# Patient Record
Sex: FEMALE | Race: WHITE | Employment: UNEMPLOYED | URBAN - METROPOLITAN AREA
[De-identification: names, ages, dates, MRNs, and addresses within clinical notes are randomized per-mention and may not be internally consistent; named-entity substitution may affect disease eponyms.]

---

## 2018-09-21 ENCOUNTER — OP HISTORICAL/CONVERTED ENCOUNTER (OUTPATIENT)
Dept: OTHER | Age: 38
End: 2018-09-21

## 2019-09-23 ENCOUNTER — OP HISTORICAL/CONVERTED ENCOUNTER (OUTPATIENT)
Dept: OTHER | Age: 39
End: 2019-09-23

## 2019-10-11 LAB — PAP SMEAR, EXTERNAL: NEGATIVE

## 2020-01-23 ENCOUNTER — ED HISTORICAL/CONVERTED ENCOUNTER (OUTPATIENT)
Dept: OTHER | Age: 40
End: 2020-01-23

## 2020-01-31 ENCOUNTER — OP HISTORICAL/CONVERTED ENCOUNTER (OUTPATIENT)
Dept: OTHER | Age: 40
End: 2020-01-31

## 2020-02-01 ENCOUNTER — OP HISTORICAL/CONVERTED ENCOUNTER (OUTPATIENT)
Dept: OTHER | Age: 40
End: 2020-02-01

## 2020-09-18 PROBLEM — I10 ESSENTIAL HYPERTENSION: Status: ACTIVE | Noted: 2020-09-18

## 2020-09-29 ENCOUNTER — OFFICE VISIT (OUTPATIENT)
Dept: OBGYN CLINIC | Age: 40
End: 2020-09-29
Payer: COMMERCIAL

## 2020-09-29 VITALS
BODY MASS INDEX: 38.59 KG/M2 | DIASTOLIC BLOOD PRESSURE: 99 MMHG | SYSTOLIC BLOOD PRESSURE: 131 MMHG | HEIGHT: 61 IN | WEIGHT: 204.38 LBS

## 2020-09-29 DIAGNOSIS — Z01.419 ROUTINE GYNECOLOGICAL EXAMINATION: Primary | ICD-10-CM

## 2020-09-29 DIAGNOSIS — Z12.4 CERVICAL CANCER SCREENING: ICD-10-CM

## 2020-09-29 DIAGNOSIS — Z12.39 BREAST CANCER SCREENING: ICD-10-CM

## 2020-09-29 DIAGNOSIS — E66.01 SEVERE OBESITY (HCC): ICD-10-CM

## 2020-09-29 PROBLEM — Z85.89 HISTORY OF NEUROENDOCRINE CANCER: Status: ACTIVE | Noted: 2020-09-29

## 2020-09-29 PROCEDURE — 99396 PREV VISIT EST AGE 40-64: CPT | Performed by: OBSTETRICS & GYNECOLOGY

## 2020-09-29 RX ORDER — GABAPENTIN 100 MG/1
CAPSULE ORAL 3 TIMES DAILY
COMMUNITY

## 2020-09-29 RX ORDER — ONDANSETRON 4 MG/1
TABLET, FILM COATED ORAL 3 TIMES DAILY
COMMUNITY
Start: 2020-01-24

## 2020-09-29 RX ORDER — VALSARTAN AND HYDROCHLOROTHIAZIDE 160; 12.5 MG/1; MG/1
1 TABLET, FILM COATED ORAL DAILY
COMMUNITY

## 2020-09-29 RX ORDER — OXYCODONE HYDROCHLORIDE 5 MG/1
TABLET ORAL 3 TIMES DAILY
COMMUNITY
Start: 2020-01-24

## 2020-09-29 RX ORDER — PANTOPRAZOLE SODIUM 40 MG/1
TABLET, DELAYED RELEASE ORAL DAILY
COMMUNITY
Start: 2020-01-16

## 2020-09-29 NOTE — PROGRESS NOTES
Aurora Turner is a 41 y.o. female who presents today for the following:  Chief Complaint   Patient presents with    Annual Exam        Allergies   Allergen Reactions    Lisinopril Cough       Current Outpatient Medications   Medication Sig    ondansetron hcl (ZOFRAN) 4 mg tablet three (3) times daily.  oxyCODONE IR (ROXICODONE) 5 mg immediate release tablet three (3) times daily.  pantoprazole (PROTONIX) 40 mg tablet daily.  valsartan-hydroCHLOROthiazide (DIOVAN-HCT) 160-12.5 mg per tablet Take 1 Tab by mouth daily.  gabapentin (NEURONTIN) 100 mg capsule Take  by mouth three (3) times daily.  vitamin D3-tocophersolan 10 mcg-4 mg/ 0.2 mL drop Take 5,000 Units by mouth daily. No current facility-administered medications for this visit. Past Medical History:   Diagnosis Date    Cancer ([facility] [address]     Hypertension        History reviewed. No pertinent surgical history.     Family History   Problem Relation Age of Onset    Hypertension Mother     Depression Mother     Hypertension Father     Depression Sister        Social History     Socioeconomic History    Marital status:      Spouse name: Not on file    Number of children: Not on file    Years of education: Not on file    Highest education level: Not on file   Occupational History    Not on file   Social Needs    Financial resource strain: Not on file    Food insecurity     Worry: Not on file     Inability: Not on file   [employer] needs     Medical: Not on file     Non-medical: Not on file   Tobacco Use    Smoking status: Never Smoker    Smokeless tobacco: Never Used   Substance and Sexual Activity    Alcohol use: Never     Frequency: Never    Drug use: Never    Sexual activity: Yes   Lifestyle    Physical activity     Days per week: Not on file     Minutes per session: Not on file    Stress: Not on file   Relationships    Social connections     Talks on phone: Not on file     Gets together: Not on file Attends Christianity service: Not on file     Active member of club or organization: Not on file     Attends meetings of clubs or organizations: Not on file     Relationship status: Not on file    Intimate partner violence     Fear of current or ex partner: Not on file     Emotionally abused: Not on file     Physically abused: Not on file     Forced sexual activity: Not on file   Other Topics Concern    Not on file   Social History Narrative    Not on file         HPI  Here for annual exam.    ROS   Review of Systems   Constitutional: Negative. HENT: Negative. Eyes: Negative. Respiratory: Negative. Cardiovascular: Negative. Gastrointestinal: Negative. Genitourinary: Negative. Musculoskeletal: Negative. Skin: Negative. Neurological: Negative. Endo/Heme/Allergies: Negative. Psychiatric/Behavioral: Negative. BP (!) 131/99 (BP 1 Location: Left arm, BP Patient Position: Sitting)   Ht 5' 1\" (1.549 m)   Wt 204 lb 6 oz (92.7 kg)   BMI 38.62 kg/m²    OBGyn Exam   Constitutional  · Appearance: well-nourished, well developed, alert, in no acute distress    HENT  · Head and Face: appears normal    Neck  · Inspection/Palpation: normal appearance, no masses or tenderness  · Lymph Nodes: no lymphadenopathy present  · Thyroid: gland size normal, nontender, no nodules or masses present on palpation    Breasts   Symmetric, no palpable masses, no tenderness, no skin changes, no nipple abnormality, no nipple discharge, no lymphadenopathy, med port palpated.     Chest  · Respiratory Effort: breathing labored  · Auscultation: normal breath sounds    Cardiovascular  · Heart:  · Auscultation: regular rate and rhythm without murmur    Gastrointestinal  · Abdominal Examination: abdomen non-tender to palpation, normal bowel sounds, no masses present  · Liver and spleen: no hepatomegaly present, spleen not palpable  · Hernias: no hernias identified    Genitourinary  · External Genitalia: normal appearance for age, no discharge present, no tenderness present, no inflammatory lesions present, no masses present, no atrophy present  · Vagina: normal vaginal vault without central or paravaginal defects, no discharge present, no inflammatory lesions present, no masses present  · Bladder: non-tender to palpation  · Urethra: appears normal  · Cervix: normal, IUD strings noted in place. · Uterus: normal size, shape and consistency  · Adnexa: no adnexal tenderness present, no adnexal masses present  · Perineum: perineum within normal limits, no evidence of trauma, no rashes or skin lesions present  · Anus: anus within normal limits, no hemorrhoids present  · Inguinal Lymph Nodes: no lymphadenopathy present    Skin  · General Inspection: no rash, no lesions identified    Neurologic/Psychiatric  · Mental Status:  · Orientation: grossly oriented to person, place and time  · Mood and Affect: mood normal, affect appropriate    No results found for this visit on 09/29/20. Orders Placed This Encounter    JEREM[facility]AS 3D [facility] W MAMMO BI SCREENING INCL CAD     Standing Status:   Future     Standing Expiration Date:   9/29/2021     Order Specific Question:   Is Patient Pregnant? Answer:   No     Order Specific Question:   Reason for Exam     Answer:   Screening    vitamin D3-tocophersolan 10 mcg-4 mg/ 0.2 mL drop     Sig: Take 5,000 Units by mouth daily.  ondansetron hcl (ZOFRAN) 4 mg tablet     Sig: three (3) times daily.  oxyCODONE IR (ROXICODONE) 5 mg immediate release tablet     Sig: three (3) times daily.  pantoprazole (PROTONIX) 40 mg tablet     Sig: daily.  valsartan-hydroCHLOROthiazide (DIOVAN-HCT) 160-12.5 mg per tablet     Sig: Take 1 Tab by mouth daily.  gabapentin (NEURONTIN) 100 mg capsule     Sig: Take  by mouth three (3) times daily.  PAP IG, CT-NG TV [address] HPV BVAO([id number], [id number])     Order Specific Question:   Pap Source?      Answer:   Cervical     Order Specific Question:   Total Hysterectomy? Answer:   No     Order Specific Question:   Supracervical Hysterectomy? Answer:   No     Order Specific Question:   Post Menopausal?     Answer:   No     Order Specific Question:   Hormone Therapy? Answer:   No     Order Specific Question:   IUD? Answer:   Yes     Order Specific Question:   Abnormal Bleeding? Answer:   No     Order Specific Question:   Pregnant     Answer:   No     Order Specific Question:   Post Partum? Answer:   No     Order Specific Question:   Other patient information related to this order? Answer:   iud         1. Routine gynecological examination  Reviewed Pap smear/HPV, mammogram, bone density colonoscopy testing guidelines. Encouraged healthy lifestyle. Discussed importanceof getting annual exams. Discussed the risk of osteoporosis and recommended 1200 to 1500 mg of calcium as well as vitamin D supplementation daily. Recommended monthly self breast exams and instructed and demonstrated to the patient on the proper technique educated on the importance of healthy weight management and the significance of not smoking. 2. Severe obesity ([address]      3. Breast cancer screening    - JEREM[facility] 3D [place] W MAMMO BI SCREENING INCL CAD; Future    4. Cervical cancer screening    - PAP IG, CT-NG TV RFX HPV IADI([id number], [id number])    History of neuroendocrine cancer on chemotherapy.     Follow-up and Dispositions    · Return in about 1 year (around 9/29/2021) for Annual Exam.

## 2020-10-02 LAB
C TRACH RRNA CVX QL NAA+PROBE: NEGATIVE
CYTOLOGIST CVX/VAG CYTO: NORMAL
CYTOLOGY CVX/VAG DOC CYTO: NORMAL
CYTOLOGY CVX/VAG DOC THIN PREP: NORMAL
DX ICD CODE: NORMAL
LABCORP, 190119: NORMAL
Lab: NORMAL
N GONORRHOEA RRNA CVX QL NAA+PROBE: NEGATIVE
OTHER STN SPEC: NORMAL
STAT OF ADQ CVX/VAG CYTO-IMP: NORMAL
T VAGINALIS RRNA SPEC QL NAA+PROBE: NEGATIVE

## 2020-10-19 ENCOUNTER — HOSPITAL ENCOUNTER (OUTPATIENT)
Dept: MAMMOGRAPHY | Age: 40
Discharge: HOME | End: 2020-10-19
Attending: OBSTETRICS & GYNECOLOGY
Payer: COMMERCIAL

## 2020-10-19 DIAGNOSIS — Z12.31 SCREENING MAMMOGRAM FOR HIGH-RISK PATIENT: ICD-10-CM

## 2020-10-19 PROCEDURE — 77063 BREAST TOMOSYNTHESIS BI: CPT

## 2020-10-27 ENCOUNTER — NURSE TRIAGE (OUTPATIENT)
Dept: OBGYN CLINIC | Age: 40
End: 2020-10-27

## 2021-01-12 ENCOUNTER — NURSE TRIAGE (OUTPATIENT)
Dept: OBGYN CLINIC | Age: 41
End: 2021-01-12

## 2021-01-12 NOTE — TELEPHONE ENCOUNTER
Patient contacted office requesting update on whether device is in the office and to scheduled appt if it is. She reports has new insurance policy. Chowdhury to check to make sure new insurance will pay for device insertion and removal at appt on 1/18/2021.

## 2021-01-18 ENCOUNTER — OFFICE VISIT (OUTPATIENT)
Dept: OBGYN CLINIC | Age: 41
End: 2021-01-18
Payer: COMMERCIAL

## 2021-01-18 VITALS
DIASTOLIC BLOOD PRESSURE: 72 MMHG | HEIGHT: 61 IN | SYSTOLIC BLOOD PRESSURE: 112 MMHG | BODY MASS INDEX: 38.89 KG/M2 | WEIGHT: 206 LBS

## 2021-01-18 DIAGNOSIS — N94.89 SUPPRESSION OF MENSES: ICD-10-CM

## 2021-01-18 DIAGNOSIS — Z30.433 ENCOUNTER FOR REMOVAL AND REINSERTION OF INTRAUTERINE CONTRACEPTIVE DEVICE (IUD): Primary | ICD-10-CM

## 2021-01-18 PROCEDURE — 58301 REMOVE INTRAUTERINE DEVICE: CPT | Performed by: OBSTETRICS & GYNECOLOGY

## 2021-01-18 PROCEDURE — 58300 INSERT INTRAUTERINE DEVICE: CPT | Performed by: OBSTETRICS & GYNECOLOGY

## 2021-01-18 NOTE — PROGRESS NOTES
Subjective:  Chief Complaints:       1. IUD Removal and Reinsertion    HPI:         Freddie Hassan is a 41 y.o. female who is here today for IUD removal and reinsertion. She was previously counseled concerning risks,benefits, side effects and alternatives. Risks include infection, excess bleeding, perforation of uterus and internal organs, rejection. All questions were answered. She denies an active vaginal infection and pregnancy. ROS:  RESPIRATORY:     Negative for shortness of breath, chest pain, chest congestion, cough. CARDIOLOGY:    Negative for dizziness, chest pain, palpitations. GASTROENTEROLOGY:    Negative for nausea, heartburn, vomiting, abdominal pain, constipation. FEMALE REPRODUCTIVE:    Negative for abnormal vaginal discharge, abnormal vaginal bleeding. UROLOGY:    Negative for difficulty urinating, voiding dysfunction, frequent urination, dysuria. Gyn History:  Patient is currently sexually active. OB History:    OB History    Para Term  AB Living   2 1 1   1 1   SAB TAB Ectopic Molar Multiple Live Births   1         1      # Outcome Date GA Lbr Omar/2nd Weight Sex Delivery Anes PTL Lv   2 Term 04 40w0d  7 lb 9 oz (3.43 kg) M Vag-Spont      1 SAB / 6w0d               Medications:  Current Outpatient Medications   Medication Sig    vitamin D3-tocophersolan 10 mcg-4 mg/ 0.2 mL drop Take 5,000 Units by mouth daily.  ondansetron hcl (ZOFRAN) 4 mg tablet three (3) times daily.  oxyCODONE IR (ROXICODONE) 5 mg immediate release tablet three (3) times daily.  pantoprazole (PROTONIX) 40 mg tablet daily.  valsartan-hydroCHLOROthiazide (DIOVAN-HCT) 160-12.5 mg per tablet Take 1 Tab by mouth daily.  gabapentin (NEURONTIN) 100 mg capsule Take  by mouth three (3) times daily. No current facility-administered medications for this visit.         Objective:  Physical Examination:  GENERAL:     General: appearance:  well-appearing   HEART: normal LUNGS:  clear bilaterally  ABDOMEN:  normal, soft. GENITOURINARY - FEMALE:      External genitalia: normal, no lesions. Urethra: normal external meatus. Vagina: unremarkable. Cervix/cuff: normal appearance, no lesions/discharge/bleeding, IUD/IUS strings present/intact IUD removed intact with strings, without difficulty. Uterus:  normal size/shape/consistency. Adnexa: B/L nontender, no masses. Assessment:  The primary encounter diagnosis was Encounter for removal and reinsertion of intrauterine contraceptive device (IUD). A diagnosis of Suppression of menses was also pertinent to this visit. Plan:  Start Mirena device, 52mg, 1 ea, by intrauteral administration, once, 1 dose. Notes:  Mirena place today without difficulty. Educated that IUD strings need to be checked periodically. Patient is to return for string checks if partner feels uncomfortable of pain due to strings. Educated to continue annual exams and paps as recommended; follow up for recheck. Patient counseled to notify provider with heavy bleeding, abdominal pain, fever or chills, missing strings. Patient understands she may have cramping, spotting and breast tenderness for up to 6 months. Procedures:  IUD Removal:     Consent:  Informed consent obtained, 30 second time out taken. The patient was placed in the lithotomy position, the cervix was prepped with betadine. Procedure:  the strings were easily visualized and grasped with ring forceps, IUD was easily removed with minimal traction. Post Procedure:  patient tolerated the procedure well. IUD Insertion:     Consent informed consent obtained, 30 second time out taken. Prep: The cervix was prepped with betadine. Procedure:  A single tooth tenaculum was placed, the uterus was sounded and found to be 7cm, the Mirena was inserted without difficulty, the strings were cut to 2cm in length. Post Procedure:   The patient tolerated procedure well, instructed to take NSAIDs as directed for cramping, informed her that cramping and spotting are to be expected. Follow-up and Dispositions    · Return in about 6 weeks (around 3/1/2021) for IUD check. Orders Placed This Encounter    REMOVE INTRAUTERINE DEVICE    INSERT INTRAUTERINE DEVICE    levonorgestreL (MIRENA) 20 mcg/24 hours (6 yrs) 52 mg IUD 20 mcg       Follow Up: Follow-up and Dispositions    · Return in about 6 weeks (around 3/1/2021) for IUD check.

## 2021-02-16 ENCOUNTER — OFFICE VISIT (OUTPATIENT)
Dept: OBGYN CLINIC | Age: 41
End: 2021-02-16
Payer: COMMERCIAL

## 2021-02-16 VITALS
SYSTOLIC BLOOD PRESSURE: 108 MMHG | WEIGHT: 205 LBS | BODY MASS INDEX: 38.71 KG/M2 | HEIGHT: 61 IN | DIASTOLIC BLOOD PRESSURE: 72 MMHG

## 2021-02-16 DIAGNOSIS — Z30.431 IUD CHECK UP: Primary | ICD-10-CM

## 2021-02-16 PROCEDURE — 99213 OFFICE O/P EST LOW 20 MIN: CPT | Performed by: OBSTETRICS & GYNECOLOGY

## 2021-02-16 NOTE — PROGRESS NOTES
Subjective:  Chief Complaints:        IUD follow up after insertion. HPI:         Nidhi Penny is a 41 y.o. female who came to today for follow up after intrauterine device insertion. She is doing well, has no complaints today. ROS:  RESPIRATORY:     Negative for shortness of breath, chest pain, chest congestion, cough. CARDIOLOGY:    Negative for dizziness, chest pain, palpitations. GASTROENTEROLOGY:    Negative for nausea, heartburn, vomiting, abdominal pain, constipation. FEMALE REPRODUCTIVE:    Negative for abnormal vaginal discharge, abnormal vaginal bleeding. UROLOGY:    Negative for difficulty urinating, voiding dysfunction, frequent urination, dysuria. GYN History   Currently sexually active. Patients last menstrual period was . OB History:  OB History    Para Term  AB Living   2 1 1   1 1   SAB TAB Ectopic Molar Multiple Live Births   1         1      # Outcome Date GA Lbr Omar/2nd Weight Sex Delivery Anes PTL Lv   2 Term 04 40w0d  7 lb 9 oz (3.43 kg) M Vag-Spont      1 SAB 03 6w0d              Current Outpatient Medications   Medication Sig    vitamin D3-tocophersolan 10 mcg-4 mg/ 0.2 mL drop Take 5,000 Units by mouth daily.  ondansetron hcl (ZOFRAN) 4 mg tablet three (3) times daily.  oxyCODONE IR (ROXICODONE) 5 mg immediate release tablet three (3) times daily.  pantoprazole (PROTONIX) 40 mg tablet daily.  valsartan-hydroCHLOROthiazide (DIOVAN-HCT) 160-12.5 mg per tablet Take 1 Tab by mouth daily.  gabapentin (NEURONTIN) 100 mg capsule Take  by mouth three (3) times daily. No current facility-administered medications for this visit. Objective:  Physical Examination:  GENERAL:     General Appearance: alert and oriented x 3, well-developed and well nourished. HEART:     Regular rate and rhythm, normal S1S2, no murmurs. LUNGS:     Clear to auscultation bilaterally, no wheezing/rhonchi/rales.   ABDOMEN:       Soft, NT/ND, no masses palpated, no hepatosplenomegaly. GENITOURINARY - FEMALE:     External genitals: normal, no lesions. Vagina:  healthy pink mucosa without any lesions. Cervix:  downward, normal appearing, no cervical movement tenderness, Intrauterine device strings noted in place. Assessment:  The encounter diagnosis was IUD check up.      Follow Up:  As needed  Annual exam

## 2021-03-02 ENCOUNTER — TELEPHONE (OUTPATIENT)
Dept: OBGYN CLINIC | Age: 41
End: 2021-03-02

## 2022-01-19 NOTE — TELEPHONE ENCOUNTER
Patient contacted office reports that she got a bill from [facility] and TV was not covered and she needs to see if it needs to be coded differently. She had a pap done. Please give this patient a call back to discuss.
Spoke with pt.
Go for blood tests as directed. Your doctor will do lab tests at regular visits to monitor the effects of this medicine. Please follow up with your doctor and keep your health care provider appointments.

## 2022-03-18 PROBLEM — E66.01 SEVERE OBESITY (HCC): Status: ACTIVE | Noted: 2020-09-29

## 2022-03-19 PROBLEM — I10 ESSENTIAL HYPERTENSION: Status: ACTIVE | Noted: 2020-09-18

## 2022-03-19 PROBLEM — Z85.89 HISTORY OF NEUROENDOCRINE CANCER: Status: ACTIVE | Noted: 2020-09-29

## 2023-05-23 RX ORDER — ONDANSETRON 4 MG/1
TABLET, FILM COATED ORAL 3 TIMES DAILY
COMMUNITY
Start: 2020-01-24

## 2023-05-23 RX ORDER — OXYCODONE HYDROCHLORIDE 5 MG/1
TABLET ORAL 3 TIMES DAILY
COMMUNITY
Start: 2020-01-24

## 2023-05-23 RX ORDER — PANTOPRAZOLE SODIUM 40 MG/1
TABLET, DELAYED RELEASE ORAL DAILY
COMMUNITY
Start: 2020-01-16

## 2023-05-23 RX ORDER — GABAPENTIN 100 MG/1
CAPSULE ORAL 3 TIMES DAILY
COMMUNITY

## 2023-05-23 RX ORDER — VALSARTAN AND HYDROCHLOROTHIAZIDE 160; 12.5 MG/1; MG/1
1 TABLET, FILM COATED ORAL DAILY
COMMUNITY